# Patient Record
Sex: MALE | Race: WHITE | NOT HISPANIC OR LATINO | Employment: FULL TIME | URBAN - METROPOLITAN AREA
[De-identification: names, ages, dates, MRNs, and addresses within clinical notes are randomized per-mention and may not be internally consistent; named-entity substitution may affect disease eponyms.]

---

## 2024-09-30 ENCOUNTER — HOSPITAL ENCOUNTER (EMERGENCY)
Facility: HOSPITAL | Age: 59
Discharge: HOME | End: 2024-10-01
Attending: EMERGENCY MEDICINE
Payer: COMMERCIAL

## 2024-09-30 DIAGNOSIS — R11.2 NAUSEA AND VOMITING, UNSPECIFIED VOMITING TYPE: ICD-10-CM

## 2024-09-30 DIAGNOSIS — K85.80 OTHER ACUTE PANCREATITIS, UNSPECIFIED COMPLICATION STATUS: Primary | ICD-10-CM

## 2024-09-30 DIAGNOSIS — R10.13 EPIGASTRIC ABDOMINAL PAIN: ICD-10-CM

## 2024-09-30 LAB
ALBUMIN SERPL BCP-MCNC: 4 G/DL (ref 3.5–5.2)
ALP SERPL-CCNC: 113 U/L (ref 55–135)
ALT SERPL W/O P-5'-P-CCNC: 182 U/L (ref 10–44)
ANION GAP SERPL CALC-SCNC: 11 MMOL/L (ref 8–16)
AST SERPL-CCNC: 183 U/L (ref 10–40)
BASOPHILS # BLD AUTO: 0.05 K/UL (ref 0–0.2)
BASOPHILS NFR BLD: 0.3 % (ref 0–1.9)
BILIRUB SERPL-MCNC: 1.1 MG/DL (ref 0.1–1)
BNP SERPL-MCNC: 13 PG/ML (ref 0–99)
BUN SERPL-MCNC: 25 MG/DL (ref 6–20)
CALCIUM SERPL-MCNC: 9.9 MG/DL (ref 8.7–10.5)
CHLORIDE SERPL-SCNC: 101 MMOL/L (ref 95–110)
CO2 SERPL-SCNC: 28 MMOL/L (ref 23–29)
CREAT SERPL-MCNC: 1.8 MG/DL (ref 0.5–1.4)
DIFFERENTIAL METHOD BLD: ABNORMAL
EOSINOPHIL # BLD AUTO: 0 K/UL (ref 0–0.5)
EOSINOPHIL NFR BLD: 0.1 % (ref 0–8)
ERYTHROCYTE [DISTWIDTH] IN BLOOD BY AUTOMATED COUNT: 13.4 % (ref 11.5–14.5)
EST. GFR  (NO RACE VARIABLE): 43 ML/MIN/1.73 M^2
GLUCOSE SERPL-MCNC: 153 MG/DL (ref 70–110)
HCT VFR BLD AUTO: 42.1 % (ref 40–54)
HGB BLD-MCNC: 14.5 G/DL (ref 14–18)
IMM GRANULOCYTES # BLD AUTO: 0.11 K/UL (ref 0–0.04)
IMM GRANULOCYTES NFR BLD AUTO: 0.6 % (ref 0–0.5)
LIPASE SERPL-CCNC: >1000 U/L (ref 4–60)
LYMPHOCYTES # BLD AUTO: 0.7 K/UL (ref 1–4.8)
LYMPHOCYTES NFR BLD: 3.8 % (ref 18–48)
MCH RBC QN AUTO: 30.7 PG (ref 27–31)
MCHC RBC AUTO-ENTMCNC: 34.4 G/DL (ref 32–36)
MCV RBC AUTO: 89 FL (ref 82–98)
MONOCYTES # BLD AUTO: 0.7 K/UL (ref 0.3–1)
MONOCYTES NFR BLD: 3.6 % (ref 4–15)
NEUTROPHILS # BLD AUTO: 17.1 K/UL (ref 1.8–7.7)
NEUTROPHILS NFR BLD: 91.6 % (ref 38–73)
NRBC BLD-RTO: 0 /100 WBC
PLATELET # BLD AUTO: 317 K/UL (ref 150–450)
PMV BLD AUTO: 9.1 FL (ref 9.2–12.9)
POTASSIUM SERPL-SCNC: 3.5 MMOL/L (ref 3.5–5.1)
PROT SERPL-MCNC: 7.8 G/DL (ref 6–8.4)
RBC # BLD AUTO: 4.73 M/UL (ref 4.6–6.2)
SODIUM SERPL-SCNC: 140 MMOL/L (ref 136–145)
TROPONIN I SERPL DL<=0.01 NG/ML-MCNC: 0.01 NG/ML (ref 0–0.03)
WBC # BLD AUTO: 18.71 K/UL (ref 3.9–12.7)

## 2024-09-30 PROCEDURE — 93010 ELECTROCARDIOGRAM REPORT: CPT | Mod: ,,, | Performed by: INTERNAL MEDICINE

## 2024-09-30 PROCEDURE — 99285 EMERGENCY DEPT VISIT HI MDM: CPT | Mod: 25

## 2024-09-30 PROCEDURE — 85025 COMPLETE CBC W/AUTO DIFF WBC: CPT | Performed by: NURSE PRACTITIONER

## 2024-09-30 PROCEDURE — 80061 LIPID PANEL: CPT | Performed by: NURSE PRACTITIONER

## 2024-09-30 PROCEDURE — 25000003 PHARM REV CODE 250: Performed by: EMERGENCY MEDICINE

## 2024-09-30 PROCEDURE — 96360 HYDRATION IV INFUSION INIT: CPT

## 2024-09-30 PROCEDURE — 83880 ASSAY OF NATRIURETIC PEPTIDE: CPT | Performed by: NURSE PRACTITIONER

## 2024-09-30 PROCEDURE — 84484 ASSAY OF TROPONIN QUANT: CPT | Performed by: NURSE PRACTITIONER

## 2024-09-30 PROCEDURE — 83690 ASSAY OF LIPASE: CPT | Performed by: NURSE PRACTITIONER

## 2024-09-30 PROCEDURE — 93005 ELECTROCARDIOGRAM TRACING: CPT

## 2024-09-30 PROCEDURE — 80053 COMPREHEN METABOLIC PANEL: CPT | Performed by: NURSE PRACTITIONER

## 2024-09-30 RX ORDER — INDOMETHACIN 50 MG/1
CAPSULE ORAL
COMMUNITY

## 2024-09-30 RX ORDER — ATORVASTATIN CALCIUM 20 MG/1
TABLET, FILM COATED ORAL
COMMUNITY

## 2024-09-30 RX ORDER — COLCHICINE 0.6 MG/1
TABLET ORAL
COMMUNITY
Start: 2024-09-24

## 2024-09-30 RX ORDER — KETOROLAC TROMETHAMINE 30 MG/ML
15 INJECTION, SOLUTION INTRAMUSCULAR; INTRAVENOUS
Status: DISCONTINUED | OUTPATIENT
Start: 2024-09-30 | End: 2024-09-30

## 2024-09-30 RX ORDER — VALSARTAN AND HYDROCHLOROTHIAZIDE 320; 25 MG/1; MG/1
TABLET, FILM COATED ORAL
COMMUNITY

## 2024-09-30 RX ORDER — AMLODIPINE BESYLATE 5 MG/1
5 TABLET ORAL 2 TIMES DAILY
COMMUNITY
Start: 2024-07-27

## 2024-09-30 RX ADMIN — SODIUM CHLORIDE 1000 ML: 9 INJECTION, SOLUTION INTRAVENOUS at 09:09

## 2024-09-30 NOTE — Clinical Note
"Antonino Nesbitt" Gilberto was seen and treated in our emergency department on 9/30/2024.  He may return to work on 10/03/2024.       If you have any questions or concerns, please don't hesitate to call.      Inocente Vargas MD"

## 2024-10-01 VITALS
HEART RATE: 72 BPM | OXYGEN SATURATION: 96 % | DIASTOLIC BLOOD PRESSURE: 64 MMHG | WEIGHT: 205 LBS | TEMPERATURE: 98 F | RESPIRATION RATE: 14 BRPM | HEIGHT: 70 IN | BODY MASS INDEX: 29.35 KG/M2 | SYSTOLIC BLOOD PRESSURE: 114 MMHG

## 2024-10-01 LAB
CHOLEST SERPL-MCNC: 152 MG/DL (ref 120–199)
CHOLEST/HDLC SERPL: 3.7 {RATIO} (ref 2–5)
HDLC SERPL-MCNC: 41 MG/DL (ref 40–75)
HDLC SERPL: 27 % (ref 20–50)
LDLC SERPL CALC-MCNC: 87 MG/DL (ref 63–159)
NONHDLC SERPL-MCNC: 111 MG/DL
OHS QRS DURATION: 98 MS
OHS QTC CALCULATION: 461 MS
TRIGL SERPL-MCNC: 120 MG/DL (ref 30–150)

## 2024-10-01 PROCEDURE — 96361 HYDRATE IV INFUSION ADD-ON: CPT

## 2024-10-01 PROCEDURE — 63600175 PHARM REV CODE 636 W HCPCS: Performed by: EMERGENCY MEDICINE

## 2024-10-01 RX ORDER — HYDROCODONE BITARTRATE AND ACETAMINOPHEN 5; 325 MG/1; MG/1
1 TABLET ORAL EVERY 6 HOURS PRN
Qty: 10 TABLET | Refills: 0 | Status: SHIPPED | OUTPATIENT
Start: 2024-10-01

## 2024-10-01 RX ADMIN — SODIUM CHLORIDE, POTASSIUM CHLORIDE, SODIUM LACTATE AND CALCIUM CHLORIDE 1000 ML: 600; 310; 30; 20 INJECTION, SOLUTION INTRAVENOUS at 12:10

## 2024-10-01 NOTE — DISCHARGE INSTRUCTIONS
A referral has been placed to Gastroenterology.  Expect a phone call in the next 1 week to help schedule appointment time.    Recommend following up with the gastroenterologist of your choice of the option provided for further evaluation of the symptoms in the next 1-2 weeks.    Recommend Tylenol as needed for minor pain.  Avoid NSAIDs such as ibuprofen, aspirin containing medicines.  Follow up with the primary care provider to monitor your kidney function.  Recommend follow up with the primary care provider in the next 1 week.

## 2024-10-01 NOTE — ED PROVIDER NOTES
Encounter Date: 9/30/2024       History     Chief Complaint   Patient presents with    Abdominal Pain     Pt arrived to the ED due to epigastric pain and n/v after eating this afternoon. Pt reports increased pain with any oral intake.      59-year-old male history of hypertension, hyperlipidemia.  No previous abdominal surgical history.  The patient reports symptom onset within the last day.  The patient reports he was eating food when he began having severe epigastric abdominal pain, nonradiating.  The symptoms were fluctuating intensity.  The symptoms have resolved at this point.  Patient states that he is nervous to eat do this severe pain.  Denies any recent alcohol.  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  Reports associated diaphoresis, nausea, vomiting.      Review of patient's allergies indicates:  No Known Allergies  Past Medical History:   Diagnosis Date    Essential (primary) hypertension     High cholesterol      History reviewed. No pertinent surgical history.  No family history on file.  Social History     Tobacco Use    Smoking status: Never    Smokeless tobacco: Never   Substance Use Topics    Alcohol use: Yes    Drug use: Never     Review of Systems   Constitutional:  Positive for diaphoresis. Negative for chills and fever.   Respiratory:  Negative for shortness of breath.    Cardiovascular:  Negative for chest pain.   Gastrointestinal:  Positive for abdominal pain, nausea and vomiting. Negative for blood in stool, constipation and diarrhea.   Genitourinary:  Negative for dysuria, flank pain and frequency.   Musculoskeletal:  Negative for back pain.   Skin:  Negative for rash.   Neurological:  Negative for headaches.       Physical Exam     Initial Vitals [09/30/24 2004]   BP Pulse Resp Temp SpO2   111/65 61 20 97.7 °F (36.5 °C) 98 %      MAP       --         Physical Exam    Nursing note and vitals reviewed.  Constitutional: He appears well-developed. He is not  diaphoretic. No distress.   HENT:   Head: Normocephalic.   Eyes: EOM are normal.   Cardiovascular:  Normal rate and regular rhythm.           No murmur heard.  Pulmonary/Chest: Effort normal and breath sounds normal. He has no wheezes.   Abdominal: Abdomen is soft and flat. He exhibits no distension and no mass. There is no abdominal tenderness. No hernia.   No right CVA tenderness.There is no rebound, no guarding and negative Cortez's sign.   Musculoskeletal:         General: Normal range of motion.     Neurological: He is alert.   Skin: Skin is warm.   No skin lesions noted to the torso.         ED Course   Procedures  Labs Reviewed   CBC W/ AUTO DIFFERENTIAL - Abnormal       Result Value    WBC 18.71 (*)     RBC 4.73      Hemoglobin 14.5      Hematocrit 42.1      MCV 89      MCH 30.7      MCHC 34.4      RDW 13.4      Platelets 317      MPV 9.1 (*)     Immature Granulocytes 0.6 (*)     Gran # (ANC) 17.1 (*)     Immature Grans (Abs) 0.11 (*)     Lymph # 0.7 (*)     Mono # 0.7      Eos # 0.0      Baso # 0.05      nRBC 0      Gran % 91.6 (*)     Lymph % 3.8 (*)     Mono % 3.6 (*)     Eosinophil % 0.1      Basophil % 0.3      Differential Method Automated     COMPREHENSIVE METABOLIC PANEL - Abnormal    Sodium 140      Potassium 3.5      Chloride 101      CO2 28      Glucose 153 (*)     BUN 25 (*)     Creatinine 1.8 (*)     Calcium 9.9      Total Protein 7.8      Albumin 4.0      Total Bilirubin 1.1 (*)     Alkaline Phosphatase 113       (*)      (*)     eGFR 43 (*)     Anion Gap 11     LIPASE - Abnormal    Lipase >1000 (*)    TROPONIN I    Troponin I 0.008     B-TYPE NATRIURETIC PEPTIDE    BNP 13     LIPID PANEL   LIPID PANEL    Cholesterol 152      Triglycerides 120      HDL 41      LDL Cholesterol 87.0      HDL/Cholesterol Ratio 27.0      Total Cholesterol/HDL Ratio 3.7      Non-HDL Cholesterol 111            Imaging Results              US Abdomen Limited (Final result)  Result time 09/30/24 23:31:39       Final result by Nora Ott MD (09/30/24 23:31:39)                   Impression:      No cholelithiasis.  Small gallbladder sludge.    Hepatic steatosis.  Hepatomegaly.      Electronically signed by: Nora Ott  Date:    09/30/2024  Time:    23:31               Narrative:    EXAMINATION:  ULTRASOUND ABDOMEN LIMITED    CLINICAL HISTORY:  epigastric abdominal pain;    TECHNIQUE:  Limited ultrasound of the right upper quadrant of the abdomen with attention to the gallbladder was performed.    COMPARISON:  None.    FINDINGS:  Liver: Increased in size, measuring 21.1 cm. Increased in echotexture.  No focal hepatic lesions.    Gallbladder: No calculi.  No wall thickening, or pericholecystic fluid.  No sonographic Cortez's sign.  Small gallbladder sludge is present.    Biliary system: The common duct is not dilated, measuring 5 mm.  No intrahepatic ductal dilatation.    Miscellaneous: The right kidney measures 9.9 cm in bipolar length.                                       Medications   sodium chloride 0.9% bolus 1,000 mL 1,000 mL (0 mLs Intravenous Stopped 9/30/24 2225)   lactated ringers bolus 1,000 mL (0 mLs Intravenous Stopped 10/1/24 0111)     Medical Decision Making    59-year-old male presenting with epigastric abdominal pain, diaphoresis, nausea and vomiting.  Patient denied any recent alcohol use.  Patient was found to have a lipase greater than a 1000.  , .  Mild hyperbilirubinemia 1.1.  Ultrasound shows no signs of cholecystitis.  No CBD dilation.  No hyperlipidemia.  The patient's pain had resolved shortly after arrival.  Suspicion of possible passed choledocholithiasis given the transaminitis and elevated lipase.  Given the patient is pain-free and tolerating liquids the patient was instructed to follow up with Gastroenterology back home in [place].  The patient also had a referral placed if he wishes to follow up with [facility] as requested per the family.  The  patient was provided analgesia should the pain or nausea returned.  Also discussed using Tylenol as needed for pain control.      The patient has a creatinine of 1.8.  The patient states he has previously had elevated creatinine.  I discussed avoiding NSAIDs.  Discussed following up with primary care for repeat check of his creatinine within the next 1 week.        Medical Decision Making:     A. Problem List:  1. Pancreatitis  2. Elevated creatinine     B. Differential diagnosis:    Pancreatitis, ACS, gastritis, biliary colic    ECG:  Please check workup area for ECG interpretation.    Part of the note was done using electronic dictation services.           Amount and/or Complexity of Data Reviewed  Labs: ordered. Decision-making details documented in ED Course.  Radiology: ordered.  ECG/medicine tests:  Decision-making details documented in ED Course.    Risk  Prescription drug management.               ED Course as of 10/01/24 0426   Mon Sep 30, 2024   2118 Creatinine(!): 1.8  Reports chronic CKD. [JM]   2136 Lipase(!): >1000 [JM]   2136 WBC(!): 18.71  Patient is not meeting SIRS criteria at this time.  [JM]   2138 EKG 12-lead  Time 8:01 p.m.     Rate 61, sinus, regular rhythm, normal axis.   QRS 98 .  No ST elevation or depression.  T-wave inversion lead 3.  No hyperacute T-waves.  No Q-waves present     Sinus rhythm with first-degree AV block. [JM]   2349 Differential diagnosis includes passed gallstone versus hyperlipidemia with pancreatitis. [JM]   [place] Oct 01, 2024   0004 Patient reports he feels well. Still has no pain. No tenderness. Given water for PO challenge.  [JM]   0009 The patient states he lives in [place], [place] roughly 3 hours away.  They would prefer follow up at [place], [place] if possible. [JM]   0036 LDL Cholesterol: 87.0 [JM]      ED Course User Index  [JM] Inocente Vargas MD                           Clinical Impression:  Final diagnoses:  [R10.13] Epigastric abdominal pain  [K85.80]  Other acute pancreatitis, unspecified complication status (Primary)  [R11.2] Nausea and vomiting, unspecified vomiting type          ED Disposition Condition    Discharge Stable          ED Prescriptions       Medication Sig Dispense Start Date End Date Auth. Provider    HYDROcodone-acetaminophen (NORCO) 5-325 mg per tablet Take 1 tablet by mouth every 6 (six) hours as needed for Pain. 10 tablet 10/1/2024 -- Inocente Vargas MD          Follow-up Information       Follow up With Specialties Details Why Contact Info    Dorita Horn MD Gastroenterology Schedule an appointment as soon as possible for a visit in 1 week [facility] [address] [facility] [address]  [phone]      [facility]  Schedule an appointment as soon as possible for a visit in 1 week Primary care [address] [facility] [address] [phone]    [facility] - Emergency Dept Emergency Medicine  If symptoms worsen [address] [facility] [place] [address] [phone]             Inocente Vargas MD  10/01/24 0426

## 2024-10-14 ENCOUNTER — TELEPHONE (OUTPATIENT)
Dept: GASTROENTEROLOGY | Facility: CLINIC | Age: 59
End: 2024-10-14
Payer: COMMERCIAL

## 2024-10-14 NOTE — TELEPHONE ENCOUNTER
----- Message from Maribel Farley sent at 10/14/2024 11:50 AM CDT -----  Regarding: Appt Access  Contact: [phone]  Patient's wife Zenobia is calling to get pt scheduled for acute pancreatitis. Pt has a referral in system. I attempted to get pt scheduled but first available is in November and pt's wife declined appt. She would like a call from office to get pt scheduled sooner if possible.     Call Back: [phone]